# Patient Record
Sex: FEMALE
[De-identification: names, ages, dates, MRNs, and addresses within clinical notes are randomized per-mention and may not be internally consistent; named-entity substitution may affect disease eponyms.]

---

## 2024-04-02 ENCOUNTER — ASOB RESULT (OUTPATIENT)
Age: 34
End: 2024-04-02

## 2024-04-02 ENCOUNTER — APPOINTMENT (OUTPATIENT)
Dept: ANTEPARTUM | Facility: CLINIC | Age: 34
End: 2024-04-02
Payer: COMMERCIAL

## 2024-04-02 PROCEDURE — 36416 COLLJ CAPILLARY BLOOD SPEC: CPT

## 2024-04-02 PROCEDURE — 76813 OB US NUCHAL MEAS 1 GEST: CPT

## 2024-04-02 PROCEDURE — 76801 OB US < 14 WKS SINGLE FETUS: CPT

## 2024-05-10 PROBLEM — Z00.00 ENCOUNTER FOR PREVENTIVE HEALTH EXAMINATION: Status: ACTIVE | Noted: 2024-05-10

## 2024-05-28 ENCOUNTER — APPOINTMENT (OUTPATIENT)
Dept: ANTEPARTUM | Facility: CLINIC | Age: 34
End: 2024-05-28
Payer: COMMERCIAL

## 2024-05-28 ENCOUNTER — ASOB RESULT (OUTPATIENT)
Age: 34
End: 2024-05-28

## 2024-05-28 PROCEDURE — 76811 OB US DETAILED SNGL FETUS: CPT

## 2024-06-04 PROBLEM — R87.619 ABNORMAL PAP SMEAR OF CERVIX: Status: ACTIVE | Noted: 2024-06-04

## 2024-06-05 ENCOUNTER — TRANSCRIPTION ENCOUNTER (OUTPATIENT)
Age: 34
End: 2024-06-05

## 2024-06-05 ENCOUNTER — APPOINTMENT (OUTPATIENT)
Dept: GYNECOLOGIC ONCOLOGY | Facility: CLINIC | Age: 34
End: 2024-06-05
Payer: COMMERCIAL

## 2024-06-05 VITALS
HEIGHT: 68 IN | SYSTOLIC BLOOD PRESSURE: 101 MMHG | HEART RATE: 74 BPM | BODY MASS INDEX: 24.25 KG/M2 | WEIGHT: 160 LBS | DIASTOLIC BLOOD PRESSURE: 67 MMHG

## 2024-06-05 DIAGNOSIS — Z3A.20 20 WEEKS GESTATION OF PREGNANCY: ICD-10-CM

## 2024-06-05 DIAGNOSIS — Z80.7 FAMILY HISTORY OF OTHER MALIGNANT NEOPLASMS OF LYMPHOID, HEMATOPOIETIC AND RELATED TISSUES: ICD-10-CM

## 2024-06-05 DIAGNOSIS — R87.619 UNSPECIFIED ABNORMAL CYTOLOGICAL FINDINGS IN SPECIMENS FROM CERVIX UTERI: ICD-10-CM

## 2024-06-05 PROCEDURE — 57455 BIOPSY OF CERVIX W/SCOPE: CPT

## 2024-06-05 PROCEDURE — 99204 OFFICE O/P NEW MOD 45 MIN: CPT | Mod: 25

## 2024-06-05 PROCEDURE — 99459 PELVIC EXAMINATION: CPT

## 2024-06-05 NOTE — DISCUSSION/SUMMARY
[Reviewed Clinical Lab Test(s)] : Results of clinical tests were reviewed. [FreeTextEntry1] : patient presenting for colposcopy in pregnancy biopsy performed, will call patient with results plan to return to Gyn all questions answered

## 2024-06-05 NOTE — PROCEDURE
[Colposcopy] : colposcopy [Abnormal Pap] : an abnormal pap smear [Patient] : the patient [Written Consent] : written consent was obtained prior to the procedure and is detailed in the patient's record [Acetowhite ___ o'clock] : ascetowhite changes at the [unfilled] ~Uo'clock position [No Abnormalities] : no abnormalities seen [Biopsies Taken: # ___] : [unfilled] biopsies taken of the cervix [ECC Done] : an endocervical curettage was not performed [Biopsy Locations ___ o'clock] : the biopsies were taken at the [unfilled] o'clock position [Endometrial Biopsy Done] : an endometrial biopsy was not performed [Silver Nitrate] : silver nitrate [No Complications] : none [Tolerated Well] : the patient tolerated the procedure well

## 2024-06-05 NOTE — PAST MEDICAL HISTORY
[Menstruating] : The patient is menstruating [Menarche Age ____] : age at menarche was [unfilled] [Definite ___ (Date)] : the last menstrual period was [unfilled] [Excessive Bleeding] : there was excessive bleeding [Regular Cycle Intervals] : have been regular [Total Preg ___] : G[unfilled] [Living ___] : Living: [unfilled]

## 2024-06-05 NOTE — HISTORY OF PRESENT ILLNESS
[FreeTextEntry1] : GYN/Ref:  Joey Pettit MD PCP:  None  Ms. 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  Patient is 20 weeks pregnant.  Patient has a history of abnormal Pap back in 2015 (LSIL) follow-up colposcopy noted ERICKSON-1.  She was advised to follow-up in 3 years reports a negative follow-up Pap.  Her most recent visit noted L RADHA again with + HPV mRNA.  She is referred for further care.  She is currently 20 weeks pregnant (LMP 1/4/2024, due date 10/10/2024)  Denies f/c/n/v/d/vaginal bleeding, changes to bowel or bladder habits.  Denies unintentional weight loss or gain.  Denies postcoital bleeding.  Denies any other associated symptoms.  Patient did have Gardasil vaccine.  She is a non-smoker.  Patient also plans to have additional children in the future.  Pap: 3/1/2024: L RADHA; + HPV mRNA; - HPV 16/18/45 ([facility]) 12/21/2015: L RADHA ([facility])  Pathology: 4/7/2016: Colposcopy ([facility]) 1.  ECC: Benign endocervical and squamous mucosa. 2.  Cervix 6:00: ERICKSON-1 3.  Cervix 1:00: ERICKSON-1  Health Maintenance: Mammogram: n/a -aware screening begins at age 40 Colonoscopy: n/a -aware screening begins at age 45 DEXA: N/A PAP: See above.

## 2024-06-05 NOTE — PHYSICAL EXAM
[Chaperone Present] : A chaperone was present in the examining room during all aspects of the physical examination [29140] : A chaperone was present during the pelvic exam. [Normal] : Recto-Vaginal Exam: Normal [Fully active, able to carry on all pre-disease performance without restriction] : Status 0 - Fully active, able to carry on all pre-disease performance without restriction [de-identified] : see colpo

## 2024-06-14 ENCOUNTER — NON-APPOINTMENT (OUTPATIENT)
Age: 34
End: 2024-06-14

## 2024-06-14 LAB — CORE LAB BIOPSY: NORMAL

## 2024-10-13 ENCOUNTER — INPATIENT (INPATIENT)
Facility: HOSPITAL | Age: 34
LOS: 1 days | Discharge: HOME | End: 2024-10-15
Attending: SPECIALIST | Admitting: SPECIALIST

## 2024-10-13 VITALS
HEART RATE: 92 BPM | RESPIRATION RATE: 16 BRPM | DIASTOLIC BLOOD PRESSURE: 71 MMHG | SYSTOLIC BLOOD PRESSURE: 105 MMHG | TEMPERATURE: 98 F

## 2024-10-13 DIAGNOSIS — Z98.890 OTHER SPECIFIED POSTPROCEDURAL STATES: Chronic | ICD-10-CM

## 2024-10-13 DIAGNOSIS — O26.899 OTHER SPECIFIED PREGNANCY RELATED CONDITIONS, UNSPECIFIED TRIMESTER: ICD-10-CM

## 2024-10-13 DIAGNOSIS — O42.90 PREMATURE RUPTURE OF MEMBRANES, UNSPECIFIED AS TO LENGTH OF TIME BETWEEN RUPTURE AND ONSET OF LABOR, UNSPECIFIED WEEKS OF GESTATION: ICD-10-CM

## 2024-10-13 LAB
BASOPHILS # BLD AUTO: 0.02 K/UL — SIGNIFICANT CHANGE UP (ref 0–0.2)
BASOPHILS NFR BLD AUTO: 0.2 % — SIGNIFICANT CHANGE UP (ref 0–2)
BLD GP AB SCN SERPL QL: NEGATIVE — SIGNIFICANT CHANGE UP
EOSINOPHIL # BLD AUTO: 0.05 K/UL — SIGNIFICANT CHANGE UP (ref 0–0.5)
EOSINOPHIL NFR BLD AUTO: 0.6 % — SIGNIFICANT CHANGE UP (ref 0–6)
HCT VFR BLD CALC: 33.4 % — LOW (ref 34.5–45)
HGB BLD-MCNC: 11.1 G/DL — LOW (ref 11.5–15.5)
IANC: 5.93 K/UL — SIGNIFICANT CHANGE UP (ref 1.8–7.4)
IMM GRANULOCYTES NFR BLD AUTO: 0.5 % — SIGNIFICANT CHANGE UP (ref 0–0.9)
LYMPHOCYTES # BLD AUTO: 1.87 K/UL — SIGNIFICANT CHANGE UP (ref 1–3.3)
LYMPHOCYTES # BLD AUTO: 21.1 % — SIGNIFICANT CHANGE UP (ref 13–44)
MCHC RBC-ENTMCNC: 31.4 PG — SIGNIFICANT CHANGE UP (ref 27–34)
MCHC RBC-ENTMCNC: 33.2 GM/DL — SIGNIFICANT CHANGE UP (ref 32–36)
MCV RBC AUTO: 94.6 FL — SIGNIFICANT CHANGE UP (ref 80–100)
MONOCYTES # BLD AUTO: 0.95 K/UL — HIGH (ref 0–0.9)
MONOCYTES NFR BLD AUTO: 10.7 % — SIGNIFICANT CHANGE UP (ref 2–14)
NEUTROPHILS # BLD AUTO: 5.93 K/UL — SIGNIFICANT CHANGE UP (ref 1.8–7.4)
NEUTROPHILS NFR BLD AUTO: 66.9 % — SIGNIFICANT CHANGE UP (ref 43–77)
NRBC # BLD: 0 /100 WBCS — SIGNIFICANT CHANGE UP (ref 0–0)
NRBC # FLD: 0 K/UL — SIGNIFICANT CHANGE UP (ref 0–0)
PLATELET # BLD AUTO: 161 K/UL — SIGNIFICANT CHANGE UP (ref 150–400)
RBC # BLD: 3.53 M/UL — LOW (ref 3.8–5.2)
RBC # FLD: 17 % — HIGH (ref 10.3–14.5)
RH IG SCN BLD-IMP: POSITIVE — SIGNIFICANT CHANGE UP
RH IG SCN BLD-IMP: POSITIVE — SIGNIFICANT CHANGE UP
T PALLIDUM AB TITR SER: NEGATIVE — SIGNIFICANT CHANGE UP
WBC # BLD: 8.86 K/UL — SIGNIFICANT CHANGE UP (ref 3.8–10.5)
WBC # FLD AUTO: 8.86 K/UL — SIGNIFICANT CHANGE UP (ref 3.8–10.5)

## 2024-10-13 RX ORDER — OXYTOCIN/RINGER'S LACTATE 20/500ML
2 PLASTIC BAG, INJECTION (ML) INTRAVENOUS
Qty: 30 | Refills: 0 | Status: DISCONTINUED | OUTPATIENT
Start: 2024-10-13 | End: 2024-10-14

## 2024-10-13 RX ORDER — SODIUM CHLORIDE IRRIG SOLUTION 0.9 %
1000 SOLUTION, IRRIGATION IRRIGATION
Refills: 0 | Status: DISCONTINUED | OUTPATIENT
Start: 2024-10-13 | End: 2024-10-14

## 2024-10-13 RX ORDER — CHLORHEXIDINE GLUCONATE ORAL RINSE 1.2 MG/ML
1 SOLUTION DENTAL DAILY
Refills: 0 | Status: DISCONTINUED | OUTPATIENT
Start: 2024-10-13 | End: 2024-10-14

## 2024-10-13 RX ORDER — OXYTOCIN/RINGER'S LACTATE 20/500ML
PLASTIC BAG, INJECTION (ML) INTRAVENOUS
Qty: 30 | Refills: 0 | Status: DISCONTINUED | OUTPATIENT
Start: 2024-10-13 | End: 2024-10-14

## 2024-10-13 RX ADMIN — Medication 125 MILLILITER(S): at 12:45

## 2024-10-13 RX ADMIN — CHLORHEXIDINE GLUCONATE ORAL RINSE 1 APPLICATION(S): 1.2 SOLUTION DENTAL at 12:45

## 2024-10-13 NOTE — OB RN TRIAGE NOTE - FALL HARM RISK - UNIVERSAL INTERVENTIONS
Bed in lowest position, wheels locked, appropriate side rails in place/Call bell, personal items and telephone in reach/Instruct patient to call for assistance before getting out of bed or chair/Non-slip footwear when patient is out of bed/[place] to call system/Physically safe environment - no spills, clutter or unnecessary equipment/Purposeful Proactive Rounding/Room/bathroom lighting operational, light cord in reach

## 2024-10-13 NOTE — OB PROVIDER H&P - LABOR: BISHOP SCORE
[No Acute Distress] : no acute distress [Well Nourished] : well nourished [Well Developed] : well developed [No JVD] : no jugular venous distention [No Respiratory Distress] : no respiratory distress  [No Accessory Muscle Use] : no accessory muscle use [Clear to Auscultation] : lungs were clear to auscultation bilaterally [Normal Rate] : normal rate  [Regular Rhythm] : with a regular rhythm [Normal S1, S2] : normal S1 and S2 [No Edema] : there was no peripheral edema [Soft] : abdomen soft [Non Tender] : non-tender [No HSM] : no HSM 4

## 2024-10-13 NOTE — OB PROVIDER LABOR PROGRESS NOTE - NS_SUBJECTIVE/OBJECTIVE_OBGYN_ALL_OB_FT
PGY4 Labor Note    Patient seen and evaluated at bedside for deceleration of fetal heart rate to 80s.  Denies complaints.  Comfortable w/ epidural.      T(C): 36.9 (10-13-24 @ 19:27), Max: 37.0 (10-13-24 @ 18:31)  HR: 75 (10-13-24 @ 22:34) (61 - 100)  BP: 122/60 (10-13-24 @ 22:31) (100/58 - 151/62)  RR: 18 (10-13-24 @ 19:27) (16 - 18)  SpO2: 97% (10-13-24 @ 22:33) (91% - 100%)
pt seen and examined at bedside for placement of cervical balloon

## 2024-10-13 NOTE — OB PROVIDER H&P - ASSESSMENT
33y/o  @40.3wks gestation admit for IOL for Premature Rupture of Membranes     -Admit to L&D   -Routine labs   -IV fluids   -Clear liquid diets   -Plan for buccal cytotec   Blood transfusion and induction/labor consents obtained. Risks, benefits, alternatives and possible complications have been discussed in detail with the patient in her native language. Pre-admission, admission, and post admission procedures and expectations were discussed in detail. All questions answered, all appropriate hospital consents were signed. Anticipate normal vaginal delivery. Informed Consent was obtained. The following was discussed:     Induction/Augmentation of Labor: Use of medication and/or cook balloon to begin or enhance labor  Obstetrical management including internal fetal/contraction monitoring  Normal vaginal delivery  Possible  Section: (surgical cut in the abdomen in order to deliver the baby)    D/W Dr. Josias Ramirez, NP  35y/o  @40.3wks gestation admit for IOL for Premature Rupture of Membranes     -Admit to L&D   -Routine labs   -IV fluids   -Clear liquid diets   -Plan for buccal cytotec     Blood transfusion and induction/labor consents obtained. Risks, benefits, alternatives and possible complications have been discussed in detail with the patient in her native language. Pre-admission, admission, and post admission procedures and expectations were discussed in detail. All questions answered, all appropriate hospital consents were signed. Anticipate normal vaginal delivery. Informed Consent was obtained. The following was discussed:     Induction/Augmentation of Labor: Use of medication and/or cook balloon to begin or enhance labor  Obstetrical management including internal fetal/contraction monitoring  Normal vaginal delivery  Possible  Section: (surgical cut in the abdomen in order to deliver the baby)    D/W Dr. Josias Ramirez, NP

## 2024-10-13 NOTE — OB RN PATIENT PROFILE - FALL HARM RISK - UNIVERSAL INTERVENTIONS
Bed in lowest position, wheels locked, appropriate side rails in place/Call bell, personal items and telephone in reach/Instruct patient to call for assistance before getting out of bed or chair/Non-slip footwear when patient is out of bed/[place] to call system/Physically safe environment - no spills, clutter or unnecessary equipment/Purposeful Proactive Rounding/Room/bathroom lighting operational, light cord in reach

## 2024-10-13 NOTE — OB PROVIDER H&P - NSLOWPPHRISK_OBGYN_A_OB
No previous uterine incision/[place] Pregnancy/Less than or equal to 4 previous vaginal births/No known bleeding disorder/No history of postpartum hemorrhage/No other PPH risks indicated

## 2024-10-13 NOTE — OB PROVIDER H&P - NSHPPHYSICALEXAM_GEN_ALL_CORE
T(C): 36.8 (10-13-24 @ 11:21), Max: 36.8 (10-13-24 @ 11:21)  HR: 75 (10-13-24 @ 12:01) (75 - 95)  BP: 117/64 (10-13-24 @ 12:01) (105/71 - 117/64)  RR: 16 (10-13-24 @ 11:21) (16 - 16)    Physical Exam  Gen: NAD  Cardiac: RRR  Pulm: Unlabored, breathing comfortably on room air  Abdomen: soft, nontender, gravid    TAUS: cephalic, Posterior placenta, MVP 1.89cm  bpm via m-mode, images saved in ASOB  SSE: Positive pooling, Positive Nitrazine.  No lesions noted internally or externally   VE: 1/50/-3  EFM: 130bpm/ moderate variability/ +accels, No decels/ Reactive NST   [place]: Irregular contractions

## 2024-10-13 NOTE — OB PROVIDER LABOR PROGRESS NOTE - ASSESSMENT
A/P: 34y P0 admitted for IOL 2/2 PROM   - fetal heart rate spontaneously recovered with maternal repositioning   - continuous monitoring/toco   - epi for pain    Lauren Kyung PGY4
34 yo  at 40/4 weeks PROM   - cervical change noted, cervical balloon no longer indicated   - plan for one more buccal cytotec and the pitocin   - cont to monitor EFM/toco    d/w Dr Ebenezer cai PA-C

## 2024-10-13 NOTE — OB PROVIDER IHI INDUCTION/AUGMENTATION NOTE - NS_OBIHIREPANPSATTEST_OBGYN_ALL_OB
[facility] Emergency Department  [address]  [facility] [address]  Phone:  [phone]  Fax:  [phone]                                    Angel Gallagher   MRN: [id number]    Department:  [facility] Emergency Department   Date of Visit:  12/27/2019           After Visit Summary Signature Page    I have received my discharge instructions, and my questions have been answered. I have discussed any challenges I see with this plan with the nurse or doctor.    ..........................................................................................................................................  Patient/Patient Representative Signature      ..........................................................................................................................................  Patient Representative Print Name and Relationship to Patient    ..................................................               ................................................  Date                                   Time    ..........................................................................................................................................  Reviewed by Signature/Title    ...................................................              ..............................................  Date                                               Time          22EPIC Rev 08/18       
I have discussed with the Attending the patient's status, as well as the H&P and the fetal status. The Attending agreed with the suggested management.

## 2024-10-13 NOTE — OB PROVIDER H&P - HISTORY OF PRESENT ILLNESS
35y/o  @40.3wks gestation presents with leaking of fluid since 0850am.  Patient states she had a gush of clear fluid and has been saturated a pad since then.  Denies vaginal bleeding, contraction pain, headache, chest pain, epigastric pain, blurred vision, dizziness, SOB.  Endorses +FM     PNC: Dr. Pettit, low risk   -GBS Negative on 2024  -History of Herpes on Valtrex   -History of Disectomy in . Patient had anesthesia consult and was approved for epidural as per patient   -[facility] sonogram 2024: Breech, posterior placenta, CAROLYN wnl, EFW 432gm/0# 15oz, left lateral myoma 2.8x3x3.5 Normal anatomy scan    33y/o  @40.3wks gestation presents with leaking of fluid since 0850am.  Patient states she had a gush of clear fluid and has been saturated a pad since then.  Denies vaginal bleeding, contraction pain, headache, chest pain, epigastric pain, blurred vision, dizziness, SOB.  Endorses +FM     PNC: Dr. Pettit, low risk   -GBS Negative on 2024  -History of Herpes on Valtrex   -History of Disectomy in . As per patient anesthesia consult was done and she was approved for epidural.     -[facility] sonogram 2024: Breech, posterior placenta, CAROLYN wnl, EFW 432gm/0# 15oz, left lateral myoma 2.8x3x3.5 Normal anatomy scan

## 2024-10-13 NOTE — OB PROVIDER LABOR PROGRESS NOTE - NS_OBIHIFHRDETAILS_OBGYN_ALL_OB_FT
baseline 130   mod nya  + accels  + decel for 5 min
130/mod variability/+accels/no decels   '  cat 1

## 2024-10-13 NOTE — OB PROVIDER H&P - NS_OBGYNHISTORY_OBGYN_ALL_OB_FT
OBHx  Denies     GYNHx   Patient verbalizes history of abnormal pap smear follow up scheduled for March 2025.  History of Fiborids measuring 2.8x3x3.5. History of Herpes on Valtrex 500mg daily last outbreak years ago.  Denies history of ovarian cyst, chlamydia, Gonorrhea, Trichomonas

## 2024-10-14 ENCOUNTER — TRANSCRIPTION ENCOUNTER (OUTPATIENT)
Age: 34
End: 2024-10-14

## 2024-10-14 RX ORDER — PRENATAL VIT,CAL 76/IRON/FOLIC 29 MG-1 MG
1 TABLET ORAL DAILY
Refills: 0 | Status: DISCONTINUED | OUTPATIENT
Start: 2024-10-14 | End: 2024-10-15

## 2024-10-14 RX ORDER — ANTI-ITCH CREAM 1 G/100G
1 OINTMENT TOPICAL EVERY 6 HOURS
Refills: 0 | Status: DISCONTINUED | OUTPATIENT
Start: 2024-10-14 | End: 2024-10-15

## 2024-10-14 RX ORDER — SOAP/LANOLIN
1 BAR TOPICAL EVERY 4 HOURS
Refills: 0 | Status: DISCONTINUED | OUTPATIENT
Start: 2024-10-14 | End: 2024-10-15

## 2024-10-14 RX ORDER — MAGNESIUM HYDROXIDE 400 MG/5ML
30 SUSPENSION, ORAL (FINAL DOSE FORM) ORAL
Refills: 0 | Status: DISCONTINUED | OUTPATIENT
Start: 2024-10-14 | End: 2024-10-15

## 2024-10-14 RX ORDER — OXYTOCIN/RINGER'S LACTATE 20/500ML
167 PLASTIC BAG, INJECTION (ML) INTRAVENOUS
Qty: 30 | Refills: 0 | Status: DISCONTINUED | OUTPATIENT
Start: 2024-10-14 | End: 2024-10-14

## 2024-10-14 RX ORDER — PRENATAL VIT,CAL 76/IRON/FOLIC 29 MG-1 MG
0 TABLET ORAL
Refills: 0 | DISCHARGE

## 2024-10-14 RX ORDER — FERROUS SULFATE 325(65) MG
0 TABLET ORAL
Refills: 0 | DISCHARGE

## 2024-10-14 RX ORDER — ACETAMINOPHEN 325 MG
975 TABLET ORAL
Refills: 0 | Status: DISCONTINUED | OUTPATIENT
Start: 2024-10-14 | End: 2024-10-15

## 2024-10-14 RX ORDER — DIBUCAINE 1 %
1 OINTMENT (GRAM) TOPICAL EVERY 6 HOURS
Refills: 0 | Status: DISCONTINUED | OUTPATIENT
Start: 2024-10-14 | End: 2024-10-15

## 2024-10-14 RX ORDER — KETOROLAC TROMETHAMINE 10 MG/1
30 TABLET, FILM COATED ORAL ONCE
Refills: 0 | Status: DISCONTINUED | OUTPATIENT
Start: 2024-10-14 | End: 2024-10-14

## 2024-10-14 RX ORDER — OXYCODONE HYDROCHLORIDE 30 MG/1
5 TABLET, FILM COATED, EXTENDED RELEASE ORAL
Refills: 0 | Status: DISCONTINUED | OUTPATIENT
Start: 2024-10-14 | End: 2024-10-15

## 2024-10-14 RX ORDER — DIPHENHYDRAMINE HCL 12.5MG/5ML
25 LIQUID (ML) ORAL EVERY 6 HOURS
Refills: 0 | Status: DISCONTINUED | OUTPATIENT
Start: 2024-10-14 | End: 2024-10-15

## 2024-10-14 RX ORDER — SODIUM CHLORIDE 0.9 % (FLUSH) 0.9 %
3 SYRINGE (ML) INJECTION EVERY 8 HOURS
Refills: 0 | Status: DISCONTINUED | OUTPATIENT
Start: 2024-10-14 | End: 2024-10-15

## 2024-10-14 RX ORDER — OXYCODONE HYDROCHLORIDE 30 MG/1
5 TABLET, FILM COATED, EXTENDED RELEASE ORAL ONCE
Refills: 0 | Status: DISCONTINUED | OUTPATIENT
Start: 2024-10-14 | End: 2024-10-15

## 2024-10-14 RX ORDER — PRAMOXINE HYDROCHLORIDE 10 MG/ML
1 LOTION TOPICAL EVERY 4 HOURS
Refills: 0 | Status: DISCONTINUED | OUTPATIENT
Start: 2024-10-14 | End: 2024-10-15

## 2024-10-14 RX ORDER — TETANUS TOXOID, REDUCED DIPHTHERIA TOXOID AND ACELLULAR PERTUSSIS VACCINE, ADSORBED 5; 2.5; 8; 8; 2.5 [IU]/.5ML; [IU]/.5ML; UG/.5ML; UG/.5ML; UG/.5ML
0.5 SUSPENSION INTRAMUSCULAR ONCE
Refills: 0 | Status: COMPLETED | OUTPATIENT
Start: 2024-10-14

## 2024-10-14 RX ORDER — VALACYCLOVIR 1000 MG/1
1 TABLET ORAL
Refills: 0 | DISCHARGE

## 2024-10-14 RX ADMIN — Medication 167 MILLIUNIT(S)/MIN: at 03:02

## 2024-10-14 RX ADMIN — Medication 1 APPLICATION(S): at 05:13

## 2024-10-14 RX ADMIN — KETOROLAC TROMETHAMINE 30 MILLIGRAM(S): 10 TABLET, FILM COATED ORAL at 02:31

## 2024-10-14 RX ADMIN — Medication 3 MILLILITER(S): at 17:45

## 2024-10-14 RX ADMIN — Medication 600 MILLIGRAM(S): at 09:07

## 2024-10-14 RX ADMIN — Medication 975 MILLIGRAM(S): at 20:32

## 2024-10-14 RX ADMIN — Medication 600 MILLIGRAM(S): at 23:25

## 2024-10-14 RX ADMIN — Medication 600 MILLIGRAM(S): at 17:33

## 2024-10-14 RX ADMIN — Medication 600 MILLIGRAM(S): at 09:55

## 2024-10-14 RX ADMIN — KETOROLAC TROMETHAMINE 30 MILLIGRAM(S): 10 TABLET, FILM COATED ORAL at 03:01

## 2024-10-14 RX ADMIN — Medication 975 MILLIGRAM(S): at 05:14

## 2024-10-14 RX ADMIN — ANTI-ITCH CREAM 1 APPLICATION(S): 1 OINTMENT TOPICAL at 05:13

## 2024-10-14 RX ADMIN — Medication 975 MILLIGRAM(S): at 21:02

## 2024-10-14 RX ADMIN — Medication 975 MILLIGRAM(S): at 05:50

## 2024-10-14 RX ADMIN — Medication 600 MILLIGRAM(S): at 18:20

## 2024-10-14 RX ADMIN — Medication 600 MILLIGRAM(S): at 23:55

## 2024-10-14 NOTE — OB RN DELIVERY SUMMARY - NS_SEPSISRSKCALC_OBGYN_ALL_OB_FT
EOS calculated successfully. EOS Risk Factor: 0.5/1000 live births ([facility] national incidence); GA=40w4d; Temp=98.6; ROM=14.85; GBS='Negative'; Antibiotics='No antibiotics or any antibiotics < 2 hrs prior to birth'

## 2024-10-14 NOTE — DISCHARGE NOTE OB - NS MD DC FALL RISK RISK
For information on Fall & Injury Prevention, visit: [email or web address] OR  [email or web address] OR  https://www.cdc.gov/steadi/patient.html

## 2024-10-14 NOTE — OB NEONATOLOGY/PEDIATRICIAN DELIVERY SUMMARY - BABY A: APGAR 1 MIN MUSCLE TONE, DELIVERY
Ongoing SW/CM Assessment/Plan of Care Note     See SW/CM flowsheets for goals and other objective data.    Patient/Family discharge goal (s):  Goal #1: Psychosocial needs assessed  Goal #2: Home discharge arranged  Goal #3: Home Care arranged or issues addressed    PT Recommendation:  Recommendation for Discharge: PT WI: Home, Home therapy    OT Recommendation:  Recommendations for Discharge: OT WI: Home therapy, Post acute therapy, Shelter(pending progress)    SLP Recommendation:  Recommendations for Discharge: SLP: Post acute therapy    Disposition:       Progress note:   Writer called Candi # [phone] with [facility]. Updated that date for discharge is unknown at this time but can anticipate end of week or early next week. Therapy recommending home therapy for patient. Yvon states that they can coordinate to arrange home RN services and home therapy. Candi will call writer back when they have found a home care agency able to service home RN and therapy for patient's location. Writer to fax home therapy orders to [facility] once home care agency is determined.  Writer met with patient to discuss plans. Patient is agreeable to home therapy.     ADD 1430:  Received call from Candi. Reports that [facility] is able to provide home RN service but not home therapy due to availability. Candi states that she is working to find additional home care agency to provide home therapy. Will call writer back with determination.          (1) flexion of extremities

## 2024-10-14 NOTE — OB RN DELIVERY SUMMARY - NSSELHIDDEN_OBGYN_ALL_OB_FT
[NS_DeliveryAttending1_OBGYN_ALL_OB_FT:[id number]],[NS_DeliveryAttending2_OBGYN_ALL_OB_FT:[id number]],[NS_DeliveryAssist1_OBGYN_ALL_OB_FT:[id number]],[NS_DeliveryRN_OBGYN_ALL_OB_FT:[id number]=]

## 2024-10-14 NOTE — OB NEONATOLOGY/PEDIATRICIAN DELIVERY SUMMARY - NSPEDSNEONOTESA_OBGYN_ALL_OB_FT
Baby is a 40.0wk AGA male born to a 33y/o  mother via . PEDS called to delivery for cat 2 tracing. Maternal history anemia and HSV on valtrex (last outbreak years ago), SSE negative. Pregnancy uncomplicated. Maternal blood type O+. PNL HIV negative, HepB negative, RPR non-reactive, and Rubella immune. GBS negative on . SROM at 0850 on 10/12. clear/bloody/mec fluids. Delivery uncomplicated***. Baby born vigorous and crying spontaneously. Warmed, dried, suctioned and stimulated. Apgars ***. EOS ***. Mom plans to breastfeed*** and consents/declines hepB. Circ requested.   BW:  :  TOB:     Physical Exam:  Gen: NAD, +grimace  HEENT: anterior fontanel open soft and flat, +caput, no cleft lip/palate, ears normal set, no ear pits or tags, nares clinically patent  Resp: no increased work of breathing, good air entry b/l  Cardio: normal S1/S2, regular rate and rhythm  Abd: soft, non tender, non distended, umbilical cord with 3 vessels  Back: spine midline, no sacral dimple or tuft of hair  Neuro: +grasp/suck/amber/palmar/plantar, normal tone  Extremities:  moving all extremities, full range of motion x 4  Skin: pink, warm, acrocyanosis  Genitals: Normal male anatomy, testicles palpable in scrotum b/l, Gabriel 1, anus patent Baby is a 40.0wk AGA male born to a 33y/o  mother via . PEDS called to delivery for cat 2 tracing. Maternal history anemia and HSV on valtrex (last outbreak years ago), SSE negative. Pregnancy uncomplicated. Maternal blood type O+. PNL HIV negative, HepB negative, RPR non-reactive, and Rubella immune. GBS negative on . SROM at 0850 on 10/12. clear fluids. Delivery c/b nuchalx1, terminal mec.. Baby born vigorous and crying spontaneously. Warmed, dried, suctioned and stimulated. Apgars 8/9. EOS 0.26. Mom plans to breastfeed and bottle and declines hepB.  BW: 3370g  : 10/13  TOB: 2341    Physical Exam:  Gen: NAD, +grimace  HEENT: anterior fontanel open soft and flat, +caput, no cleft lip/palate, ears normal set, no ear pits or tags, nares clinically patent  Resp: no increased work of breathing, good air entry b/l  Cardio: normal S1/S2, regular rate and rhythm  Abd: soft, non tender, non distended, umbilical cord with 3 vessels  Back: spine midline, no sacral dimple or tuft of hair  Neuro: +grasp/suck/amber/palmar/plantar, normal tone  Extremities:  moving all extremities, full range of motion x 4  Skin: pink, warm, acrocyanosis  Genitals: Normal male anatomy, testicles palpable in scrotum b/l, Gabriel 1, anus patent

## 2024-10-14 NOTE — DISCHARGE NOTE OB - PATIENT PORTAL LINK FT
You can access the FollowMyHealth Patient Portal offered by [facility] by registering at the following website: [email or web address]. By joining [facility]’s FollowMyHealth portal, you will also be able to view your health information using other applications (apps) compatible with our system.

## 2024-10-14 NOTE — OB PROVIDER DELIVERY SUMMARY - NSSELHIDDEN_OBGYN_ALL_OB_FT
[NS_DeliveryAttending1_OBGYN_ALL_OB_FT:[id number]],[NS_DeliveryAttending2_OBGYN_ALL_OB_FT:[id number]],[NS_DeliveryAssist1_OBGYN_ALL_OB_FT:[id number]=]

## 2024-10-14 NOTE — DISCHARGE NOTE OB - CARE PROVIDER_API CALL
Joey Pettit.  Obstetrics and Gynecology  [address] [place] [address]  Phone: [phone]  Fax: [phone]  Follow Up Time:

## 2024-10-14 NOTE — PROGRESS NOTE ADULT - SUBJECTIVE AND OBJECTIVE BOX
S: Patient doing well. Minimal lochia. Pain controlled.    O: Vital Signs Last 24 Hrs  T(C): 36.8 (14 Oct 2024 05:05), Max: 37.0 (13 Oct 2024 18:31)  T(F): 98.3 (14 Oct 2024 05:05), Max: 98.6 (13 Oct 2024 18:31)  HR: 63 (14 Oct 2024 05:05) (61 - 196)  BP: 108/56 (14 Oct 2024 05:05) (100/54 - 151/62)  BP(mean): --  RR: 17 (14 Oct 2024 05:05) (16 - 18)  SpO2: 100% (14 Oct 2024 05:05) (86% - 100%)    Parameters below as of 14 Oct 2024 05:05  Patient On (Oxygen Delivery Method): room air        Gen: NAD  Abd: soft, NT, ND, fundus firm below umbilicus  Lochia: moderate  Ext: no tenderness    Labs:                        11.1   8.86  )-----------( 161      ( 13 Oct 2024 12:35 )             33.4     rh pos    A: 34y PPD#1 s/p  doing well.    Plan:  continue care  discharge instructions given

## 2024-10-14 NOTE — OB PROVIDER DELIVERY SUMMARY - NSPROVIDERDELIVERYNOTE_OBGYN_ALL_OB_FT
Presented to patient's bedside due to cat 2 FHT with late decelerations.  Patient examined and found to be fully dilated at 0 station with forebag.  Forebag ruptured to clear fluid.  Patient instructed to push.  Peds called for delivery.   of liveborn male infant.  Head, shoulders and body delivered easily in CHAD position through tight nuchal cord x1.  Delayed cord clamping 30s.  Cord clamped and cut, infant to pediatricians.  Placenta delivered intact.  Vaginal exam revealed intact cervix, vaginal walls, sulci. Second degree laceration identified and repaired in usual fashion with 2.0 and 3.0 chromic suture.  Bimanual exam performed, with minimal clot evacuated.  Intermittent atony noted, bladder straight catheterized with return of 150cc yellow urine.  Continued intermittent atony noted.  1000mcg Cytotec AL administered.  Fundus and lower uterine segment firm. Excellent hemostasis. Count was correct x2.

## 2024-10-15 VITALS
TEMPERATURE: 98 F | SYSTOLIC BLOOD PRESSURE: 109 MMHG | RESPIRATION RATE: 18 BRPM | DIASTOLIC BLOOD PRESSURE: 76 MMHG | HEART RATE: 57 BPM | OXYGEN SATURATION: 100 %

## 2024-10-15 RX ORDER — TETANUS TOXOID, REDUCED DIPHTHERIA TOXOID AND ACELLULAR PERTUSSIS VACCINE, ADSORBED 5; 2.5; 8; 8; 2.5 [IU]/.5ML; [IU]/.5ML; UG/.5ML; UG/.5ML; UG/.5ML
0.5 SUSPENSION INTRAMUSCULAR ONCE
Refills: 0 | Status: COMPLETED | OUTPATIENT
Start: 2024-10-15 | End: 2024-10-15

## 2024-10-15 RX ADMIN — Medication 975 MILLIGRAM(S): at 02:34

## 2024-10-15 RX ADMIN — TETANUS TOXOID, REDUCED DIPHTHERIA TOXOID AND ACELLULAR PERTUSSIS VACCINE, ADSORBED 0.5 MILLILITER(S): 5; 2.5; 8; 8; 2.5 SUSPENSION INTRAMUSCULAR at 05:34

## 2024-10-15 RX ADMIN — Medication 975 MILLIGRAM(S): at 02:04

## 2024-10-30 PROBLEM — B00.9 HERPESVIRAL INFECTION, UNSPECIFIED: Chronic | Status: ACTIVE | Noted: 2024-10-13

## 2024-11-18 ENCOUNTER — NON-APPOINTMENT (OUTPATIENT)
Age: 34
End: 2024-11-18

## 2024-11-18 ENCOUNTER — APPOINTMENT (OUTPATIENT)
Facility: CLINIC | Age: 34
End: 2024-11-18

## 2024-11-18 DIAGNOSIS — Z87.39 PERSONAL HISTORY OF OTHER DISEASES OF THE MUSCULOSKELETAL SYSTEM AND CONNECTIVE TISSUE: ICD-10-CM

## 2024-11-18 DIAGNOSIS — Z82.49 FAMILY HISTORY OF ISCHEMIC HEART DISEASE AND OTHER DISEASES OF THE CIRCULATORY SYSTEM: ICD-10-CM

## 2024-11-18 DIAGNOSIS — Z83.3 FAMILY HISTORY OF DIABETES MELLITUS: ICD-10-CM

## 2024-11-18 PROCEDURE — 0503F POSTPARTUM CARE VISIT: CPT

## 2025-01-31 ENCOUNTER — NON-APPOINTMENT (OUTPATIENT)
Age: 35
End: 2025-01-31

## 2025-01-31 DIAGNOSIS — Z87.42 PERSONAL HISTORY OF OTHER DISEASES OF THE FEMALE GENITAL TRACT: ICD-10-CM

## 2025-01-31 DIAGNOSIS — Z86.2 PERSONAL HISTORY OF DISEASES OF THE BLOOD AND BLOOD-FORMING ORGANS AND CERTAIN DISORDERS INVOLVING THE IMMUNE MECHANISM: ICD-10-CM

## 2025-02-03 ENCOUNTER — LABORATORY RESULT (OUTPATIENT)
Age: 35
End: 2025-02-03

## 2025-02-03 ENCOUNTER — APPOINTMENT (OUTPATIENT)
Facility: CLINIC | Age: 35
End: 2025-02-03

## 2025-02-03 VITALS — DIASTOLIC BLOOD PRESSURE: 74 MMHG | SYSTOLIC BLOOD PRESSURE: 113 MMHG

## 2025-02-03 DIAGNOSIS — Z01.419 ENCOUNTER FOR GYNECOLOGICAL EXAMINATION (GENERAL) (ROUTINE) W/OUT ABNORMAL FINDINGS: ICD-10-CM

## 2025-02-03 LAB — HCG UR QL: NEGATIVE

## 2025-02-03 PROCEDURE — 99459 PELVIC EXAMINATION: CPT

## 2025-02-03 PROCEDURE — 99395 PREV VISIT EST AGE 18-39: CPT

## 2025-02-03 PROCEDURE — 81025 URINE PREGNANCY TEST: CPT

## 2025-02-04 LAB
C TRACH RRNA SPEC QL NAA+PROBE: NOT DETECTED
HPV HIGH+LOW RISK DNA PNL CVX: DETECTED
N GONORRHOEA RRNA SPEC QL NAA+PROBE: NOT DETECTED
SOURCE AMPLIFICATION: NORMAL

## 2025-02-07 LAB — CYTOLOGY CVX/VAG DOC THIN PREP: NORMAL

## 2025-02-24 ENCOUNTER — APPOINTMENT (OUTPATIENT)
Facility: CLINIC | Age: 35
End: 2025-02-24

## 2025-02-24 ENCOUNTER — ASOB RESULT (OUTPATIENT)
Age: 35
End: 2025-02-24

## 2025-02-24 VITALS — DIASTOLIC BLOOD PRESSURE: 80 MMHG | SYSTOLIC BLOOD PRESSURE: 116 MMHG

## 2025-02-24 LAB — HCG UR QL: NEGATIVE

## 2025-02-24 PROCEDURE — 58300 INSERT INTRAUTERINE DEVICE: CPT

## 2025-02-24 PROCEDURE — 99401 PREV MED CNSL INDIV APPRX 15: CPT | Mod: 25

## 2025-02-24 PROCEDURE — 76998 US GUIDE INTRAOP: CPT

## 2025-02-24 PROCEDURE — 81025 URINE PREGNANCY TEST: CPT

## 2025-02-24 PROCEDURE — 99401 PREV MED CNSL INDIV APPRX 15: CPT

## 2025-03-12 ENCOUNTER — APPOINTMENT (OUTPATIENT)
Dept: GYNECOLOGIC ONCOLOGY | Facility: CLINIC | Age: 35
End: 2025-03-12
Payer: COMMERCIAL

## 2025-03-12 ENCOUNTER — NON-APPOINTMENT (OUTPATIENT)
Age: 35
End: 2025-03-12

## 2025-03-12 VITALS
DIASTOLIC BLOOD PRESSURE: 80 MMHG | SYSTOLIC BLOOD PRESSURE: 117 MMHG | TEMPERATURE: 97.3 F | RESPIRATION RATE: 17 BRPM | HEIGHT: 68 IN | HEART RATE: 70 BPM | OXYGEN SATURATION: 100 % | WEIGHT: 170 LBS | BODY MASS INDEX: 25.76 KG/M2

## 2025-03-12 DIAGNOSIS — B97.7 PAPILLOMAVIRUS AS THE CAUSE OF DISEASES CLASSIFIED ELSEWHERE: ICD-10-CM

## 2025-03-12 PROCEDURE — 99459 PELVIC EXAMINATION: CPT

## 2025-03-12 PROCEDURE — 57454 BX/CURETT OF CERVIX W/SCOPE: CPT

## 2025-03-12 PROCEDURE — 99214 OFFICE O/P EST MOD 30 MIN: CPT | Mod: 25

## 2025-03-21 LAB — CORE LAB BIOPSY: NORMAL

## 2025-03-26 ENCOUNTER — ASOB RESULT (OUTPATIENT)
Age: 35
End: 2025-03-26

## 2025-03-26 ENCOUNTER — APPOINTMENT (OUTPATIENT)
Facility: CLINIC | Age: 35
End: 2025-03-26
Payer: COMMERCIAL

## 2025-03-26 VITALS — DIASTOLIC BLOOD PRESSURE: 78 MMHG | SYSTOLIC BLOOD PRESSURE: 113 MMHG

## 2025-03-26 DIAGNOSIS — N83.02 FOLLICULAR CYST OF LEFT OVARY: ICD-10-CM

## 2025-03-26 DIAGNOSIS — N92.6 IRREGULAR MENSTRUATION, UNSPECIFIED: ICD-10-CM

## 2025-03-26 LAB — HCG UR QL: NEGATIVE

## 2025-03-26 PROCEDURE — 76830 TRANSVAGINAL US NON-OB: CPT

## 2025-03-26 PROCEDURE — 99459 PELVIC EXAMINATION: CPT

## 2025-03-26 PROCEDURE — 99213 OFFICE O/P EST LOW 20 MIN: CPT

## 2025-03-26 PROCEDURE — 81025 URINE PREGNANCY TEST: CPT

## 2025-03-26 PROCEDURE — 76376 3D RENDER W/INTRP POSTPROCES: CPT

## 2025-04-09 ENCOUNTER — APPOINTMENT (OUTPATIENT)
Facility: CLINIC | Age: 35
End: 2025-04-09

## 2025-07-14 ENCOUNTER — ASOB RESULT (OUTPATIENT)
Age: 35
End: 2025-07-14

## 2025-07-14 ENCOUNTER — APPOINTMENT (OUTPATIENT)
Facility: CLINIC | Age: 35
End: 2025-07-14
Payer: COMMERCIAL

## 2025-07-14 VITALS — DIASTOLIC BLOOD PRESSURE: 80 MMHG | SYSTOLIC BLOOD PRESSURE: 115 MMHG

## 2025-07-14 PROBLEM — D21.9 FIBROID: Status: ACTIVE | Noted: 2025-07-14

## 2025-07-14 PROBLEM — D25.9 FIBROID UTERUS: Status: ACTIVE | Noted: 2025-07-14

## 2025-07-14 LAB — HCG UR QL: NEGATIVE

## 2025-07-14 PROCEDURE — 81025 URINE PREGNANCY TEST: CPT

## 2025-07-14 PROCEDURE — 99459 PELVIC EXAMINATION: CPT

## 2025-07-14 PROCEDURE — 76830 TRANSVAGINAL US NON-OB: CPT

## 2025-07-14 PROCEDURE — 76376 3D RENDER W/INTRP POSTPROCES: CPT

## 2025-07-14 PROCEDURE — 76856 US EXAM PELVIC COMPLETE: CPT

## 2025-07-14 PROCEDURE — 99214 OFFICE O/P EST MOD 30 MIN: CPT

## 2025-07-14 RX ORDER — VALACYCLOVIR 500 MG/1
500 TABLET, FILM COATED ORAL
Qty: 30 | Refills: 2 | Status: ACTIVE | COMMUNITY
Start: 2025-07-14 | End: 1900-01-01

## 2025-07-16 LAB
C TRACH RRNA SPEC QL NAA+PROBE: NOT DETECTED
N GONORRHOEA RRNA SPEC QL NAA+PROBE: NOT DETECTED
SOURCE AMPLIFICATION: NORMAL

## 2025-09-10 ENCOUNTER — APPOINTMENT (OUTPATIENT)
Facility: CLINIC | Age: 35
End: 2025-09-10